# Patient Record
Sex: MALE | Race: WHITE
[De-identification: names, ages, dates, MRNs, and addresses within clinical notes are randomized per-mention and may not be internally consistent; named-entity substitution may affect disease eponyms.]

---

## 2017-11-07 ENCOUNTER — HOSPITAL ENCOUNTER (OUTPATIENT)
Dept: HOSPITAL 61 - PCVCIMAG | Age: 72
Discharge: HOME | End: 2017-11-07
Attending: INTERNAL MEDICINE
Payer: COMMERCIAL

## 2017-11-07 DIAGNOSIS — I77.9: ICD-10-CM

## 2017-11-07 DIAGNOSIS — Z79.899: ICD-10-CM

## 2017-11-07 DIAGNOSIS — R94.31: ICD-10-CM

## 2017-11-07 DIAGNOSIS — J44.9: ICD-10-CM

## 2017-11-07 DIAGNOSIS — Z95.1: ICD-10-CM

## 2017-11-07 DIAGNOSIS — Z79.82: ICD-10-CM

## 2017-11-07 DIAGNOSIS — I45.10: ICD-10-CM

## 2017-11-07 DIAGNOSIS — E66.9: ICD-10-CM

## 2017-11-07 DIAGNOSIS — M19.90: ICD-10-CM

## 2017-11-07 DIAGNOSIS — I73.9: ICD-10-CM

## 2017-11-07 DIAGNOSIS — I38: ICD-10-CM

## 2017-11-07 DIAGNOSIS — Z87.891: ICD-10-CM

## 2017-11-07 DIAGNOSIS — I42.9: ICD-10-CM

## 2017-11-07 DIAGNOSIS — I25.10: Primary | ICD-10-CM

## 2017-11-07 DIAGNOSIS — R00.1: ICD-10-CM

## 2017-11-07 DIAGNOSIS — R06.02: ICD-10-CM

## 2017-11-07 PROCEDURE — 80061 LIPID PANEL: CPT

## 2017-11-07 PROCEDURE — 93017 CV STRESS TEST TRACING ONLY: CPT

## 2017-11-07 PROCEDURE — G0463 HOSPITAL OUTPT CLINIC VISIT: HCPCS

## 2017-11-07 PROCEDURE — 78452 HT MUSCLE IMAGE SPECT MULT: CPT

## 2017-11-07 PROCEDURE — A9500 TC99M SESTAMIBI: HCPCS

## 2017-11-07 PROCEDURE — 93005 ELECTROCARDIOGRAM TRACING: CPT

## 2017-11-07 PROCEDURE — 36415 COLL VENOUS BLD VENIPUNCTURE: CPT

## 2017-11-07 NOTE — PCVCIMAG
--------------- APPROVED REPORT --------------





Exam: Nuclear Stress Test

Indication: CAD

Patient Location: Out-Patient

Stress Nurse: Nkechi Carter RN, Gloria Langley RN

NM Tech:Anthony Dewitt, NMTCB



Ht: 5 ft 9 in Wt: 250 lbs BSA:  2.27 m2

HR: 51 bpm                      BP: 182/75 mmHg         BMI:  

36.9

Rhythm:  SB, RBBB



Medical History

Medical History: Age, PVD Carotids, CVD, TIA, COPD, Obesity, CAD, 

Former Smoker

Medications: Amlodipine, ASA, Atorvastatin, Clonidine, Imdur, Hyzaar, 

Protonix

Allergies: Amoxicillin, PCN, Augmentin

Previous Cardiac Procedures: CABG

Pretest Chest Pain Characteristics: No chest pain

Exercise History: Sedentary



Stress Test Details

Stress Test:  Pharmacologic stress testing performed using 0.4 mg of 

regadenoson per 5 mL given IV over 10 seconds.

  Reason for pharmacologic stress test: physical 

limitation.

HR

Resting HR:            51 bpmMax Heart Rate (APMHR): 148 bpm 

Max HR Achieved:  65 bpmTarget HR (85% APMHR): 125 bpm

% of APMHR:         43

Recovery HR:            55 bpm



BP

Resting BP:  182/75 mmHg

Max BP:       171/74 mmHg



ECG

Resting ECG:  Sinus Bradycardia, RBBB

Stress ECG:     Sinus Rhythm, RBBB

ST Change: Non-ischemic

Recovery ECG: Sinus Rhythm, RBBB



Clinical

Reason for Termination: Completed protocol

Stress Symptoms: Dyspnea, Lightheaded

Exercise duration:  min 55 sec

Exercise capacity: 1.0 METs

Symptoms resolved during recovery



NM EXAM: Myocardial Perfusion REST/STRESS

Imaging Protocol: Rest Tc-99m/Stress Tc-99m 1 day



Resting Data

Rest SPECT myocardial perfusion imaging was performed in supine 

position 45 minutes following the intravenous injection of 16.2 mCi 

of Tc-99m Sestamibi.

Time of rest injection: 0750     Date: 11/07/2017

Administration Route: IV

Administration Site: Right Hand



Pharmacologic Stress

Pharmacologic stress test was performed by injecting Regadenoson 0.4 

mg IV push followed by the intravenous injection of 44.9 mCi of 

Tc-99m Sestamibi.

Time of stress injection: 0915     Date: 11/07/2017

Administration Route: IV

Administration Site: Right Hand

Gated Stress SPECT was performed 45 minutes after stress 

injection.

The images were gated to evaluate regional wall motion and calculate 

left ventricular ejection fraction. 



Study Quality

Study: Good



Study Data

Post stress, the left ventricular ejection was 59%..

SSS: 6

SRS: 3

SDS: 4

TID = 1.18.



Perfusion

There is a medium area of moderately reduced uptake in the mid and 

apical segment of the anterior wall which is seen on the stress 

images and improves on the resting images. This area is hypokinetic 

and is most consistent with myocardial scar with segun-infarct 

ischemia.



Wall Motion

Hypokinesis of the distal anteroseptal segment.



Nuclear Conclusion

ECG Findings: non-ischemic

Clinical Findings: non-diagnostic

Nuclear Findings: positive for ischemia

This study reveals an incomplete infarct in the mid to apical 

anterior segment with segun-infarct ischemia.

There is normal LV systolic function, with mild hypokinesis of the 

distal anterior segment.

## 2018-07-19 ENCOUNTER — HOSPITAL ENCOUNTER (OUTPATIENT)
Dept: HOSPITAL 61 - PCVCCLINIC | Age: 73
Discharge: HOME | End: 2018-07-19
Attending: INTERNAL MEDICINE
Payer: MEDICARE

## 2018-07-19 DIAGNOSIS — I42.9: ICD-10-CM

## 2018-07-19 DIAGNOSIS — Z95.1: ICD-10-CM

## 2018-07-19 DIAGNOSIS — Z88.8: ICD-10-CM

## 2018-07-19 DIAGNOSIS — Z79.899: ICD-10-CM

## 2018-07-19 DIAGNOSIS — I25.10: Primary | ICD-10-CM

## 2018-07-19 DIAGNOSIS — Z79.82: ICD-10-CM

## 2018-07-19 DIAGNOSIS — Z87.891: ICD-10-CM

## 2018-07-19 PROCEDURE — 80061 LIPID PANEL: CPT

## 2018-07-19 PROCEDURE — 93005 ELECTROCARDIOGRAM TRACING: CPT

## 2019-07-23 ENCOUNTER — HOSPITAL ENCOUNTER (OUTPATIENT)
Dept: HOSPITAL 61 - PCVCIMAG | Age: 74
Discharge: HOME | End: 2019-07-23
Attending: INTERNAL MEDICINE
Payer: MEDICARE

## 2019-07-23 DIAGNOSIS — E11.9: ICD-10-CM

## 2019-07-23 DIAGNOSIS — R07.9: ICD-10-CM

## 2019-07-23 DIAGNOSIS — Z87.891: ICD-10-CM

## 2019-07-23 DIAGNOSIS — I25.10: ICD-10-CM

## 2019-07-23 DIAGNOSIS — I42.9: Primary | ICD-10-CM

## 2019-07-23 DIAGNOSIS — R06.02: ICD-10-CM

## 2019-07-23 DIAGNOSIS — Z88.0: ICD-10-CM

## 2019-07-23 DIAGNOSIS — Z95.1: ICD-10-CM

## 2019-07-23 DIAGNOSIS — I10: ICD-10-CM

## 2019-07-23 DIAGNOSIS — E78.5: ICD-10-CM

## 2019-07-23 PROCEDURE — 93017 CV STRESS TEST TRACING ONLY: CPT

## 2019-07-23 PROCEDURE — A9500 TC99M SESTAMIBI: HCPCS

## 2019-07-23 PROCEDURE — 78452 HT MUSCLE IMAGE SPECT MULT: CPT

## 2019-07-23 NOTE — PCVCIMAG
--------------- APPROVED REPORT --------------





Imaging Protocol: Rest Tc-99m/Stress Tc-99m 1 day

Study performed:  07/23/2019 08:23:19



Indication: Cardiomyopathy, Chest pain, CAD

Patient Location: Out-Patient

Stress Nurse: Gloria Langley RN, MAYE Arriaga Tech:Anthony DewittPEDRO



Ht: 5 ft 9 in Wt: 240 lbs BSA:  2.23 m2

HR: 48 bpm                      BP: 169/76 mmHg         BMI:  

35.4

Rhythm:  Sinus Bradycardia, RBBB



Medical History

Medical History: Age, Hyperlipidemia, HTN, DM Non 

Insulin

Medications: Norvasc, ASA, Atorvastatin, Catapress, Imdur, 

Losartan-HCTZ, Metoprolol, NTG SL, Actos

Allergies: Amoxicillen

Previous Cardiac Procedures: CABG, PCI

Exercise History: Physically active

Meds Held (24 hrs): Nitrates and Metoprolol



Resting Data

Rest SPECT myocardial perfusion imaging was performed in supine 

position 45 minutes following the intravenous injection of 15.9 mCi 

of Tc-99m Sestamibi.

Time of rest injection: 0830     Date: 07/23/2019

Administration Route: IV

Administration Site: Right AC



Pharmacologic Stress

Pharmacologic stress test was performed by injecting Regadenoson 0.4 

mg IV push over 10-15 seconds immediately followed by the intravenous 

injection of 46.4 mCi of Tc-99m Sestamibi.

Time of stress injection: 0945     Date: 07/23/2019

Administration Route: IV

Administration Site: Right AC

Gated Stress SPECT was performed 45 minutes after stress 

injection.

The images were gated to evaluate regional wall motion and calculate 

left ventricular ejection fraction. 



Stress Test Details

Stress Test:  Pharmacologic stress testing performed using 0.4 mg of 

regadenoson per 5 mL given IV over 10 seconds.

  Reason for pharmacologic stress test: physical limitation.



HRMax Heart Rate (APMHR): 146 bpm 

Resting HR:            48 bpmTarget HR (85% APMHR): 124 bpm

Max HR Achieved:  68 bpm

% of APMHR:         46

Recovery HR:            60 bpm



BP

Resting BP:  169/76 mmHg

Max BP:       170/79 mmHg

Recovery BP:       170/77 mmHg

ECG

Resting ECG:  Sinus Bradycardia, RBBB

Stress ECG:     Sinus Rhythm, RBBB

Arrhythmia:    None

Recovery ECG: Sinus Rhythm, RBBB



Clinical

Reason for Termination: Completed protocol

Stress Symptoms: Abdominal Discomfort, Dyspnea, Left Arm 

Tingling

Symptoms resolved with caffeine.



Nurse Comments

Post test in waiting room patient did experience chest discomfort and 

nausea for a few moments.  Resolved with continued caffeiene 

intake.



Stress ECG Conclusion

ECG: Non-ischemic



Study Quality

Study: Good



Study Data

Post stress, the left ventricular ejection was 65%..

SSS: 4

SRS: 4

SDS: 2

TID = 0.97.



Perfusion

Medium sized area of moderate reversible ischemia involving the mid 

anterior left ventricle consistent with a left anterior descending 

distribution. This was seen on 2017 study and is less prominent 

today.



Wall Motion

Normal left ventricular function with no regional wall motion 

abnormalities.



Nuclear Conclusion

Medium sized area of moderate reversible ischemia involving the mid 

anterior left ventricle consistent with a left anterior descending 

distribution. This was seen on 2017 study and is less prominent 

today.

Post stress, the left ventricular ejection was 65%. 



Interpreted by:  Ramón Ko MD

Electronically Approved: 07/23/2019 

11:04:30



<Conclusion>

ECG: Non-ischemic

## 2019-11-08 ENCOUNTER — HOSPITAL ENCOUNTER (OUTPATIENT)
Dept: HOSPITAL 61 - PCVCCLINIC | Age: 74
Discharge: HOME | End: 2019-11-08
Attending: INTERNAL MEDICINE
Payer: COMMERCIAL

## 2019-11-08 DIAGNOSIS — Z88.1: ICD-10-CM

## 2019-11-08 DIAGNOSIS — Z87.891: ICD-10-CM

## 2019-11-08 DIAGNOSIS — I45.10: ICD-10-CM

## 2019-11-08 DIAGNOSIS — Z79.899: ICD-10-CM

## 2019-11-08 DIAGNOSIS — E66.9: ICD-10-CM

## 2019-11-08 DIAGNOSIS — Z82.49: ICD-10-CM

## 2019-11-08 DIAGNOSIS — E11.9: ICD-10-CM

## 2019-11-08 DIAGNOSIS — I65.23: ICD-10-CM

## 2019-11-08 DIAGNOSIS — I10: ICD-10-CM

## 2019-11-08 DIAGNOSIS — Z79.82: ICD-10-CM

## 2019-11-08 DIAGNOSIS — J44.9: ICD-10-CM

## 2019-11-08 DIAGNOSIS — Z95.1: ICD-10-CM

## 2019-11-08 DIAGNOSIS — E78.5: ICD-10-CM

## 2019-11-08 DIAGNOSIS — I25.10: Primary | ICD-10-CM

## 2019-11-08 PROCEDURE — 80061 LIPID PANEL: CPT

## 2019-11-08 PROCEDURE — 36415 COLL VENOUS BLD VENIPUNCTURE: CPT

## 2019-11-08 PROCEDURE — 93005 ELECTROCARDIOGRAM TRACING: CPT

## 2019-11-08 PROCEDURE — G0463 HOSPITAL OUTPT CLINIC VISIT: HCPCS
